# Patient Record
Sex: FEMALE | Race: BLACK OR AFRICAN AMERICAN | Employment: OTHER | URBAN - METROPOLITAN AREA
[De-identification: names, ages, dates, MRNs, and addresses within clinical notes are randomized per-mention and may not be internally consistent; named-entity substitution may affect disease eponyms.]

---

## 2017-04-06 ENCOUNTER — OP HISTORICAL/CONVERTED ENCOUNTER (OUTPATIENT)
Dept: OTHER | Age: 67
End: 2017-04-06

## 2019-10-03 LAB — PAP SMEAR, EXTERNAL: NORMAL

## 2019-10-10 LAB — MAMMOGRAPHY, EXTERNAL: NEGATIVE

## 2020-05-13 LAB
CREATININE, EXTERNAL: 0.8
HBA1C MFR BLD HPLC: 5.9 %
LDL-C, EXTERNAL: 102

## 2020-06-29 ENCOUNTER — OFFICE VISIT (OUTPATIENT)
Dept: ENDOCRINOLOGY | Age: 70
End: 2020-06-29

## 2020-06-29 VITALS
HEIGHT: 64 IN | HEART RATE: 66 BPM | DIASTOLIC BLOOD PRESSURE: 87 MMHG | OXYGEN SATURATION: 95 % | SYSTOLIC BLOOD PRESSURE: 178 MMHG | TEMPERATURE: 98 F | RESPIRATION RATE: 20 BRPM | BODY MASS INDEX: 28.48 KG/M2 | WEIGHT: 166.8 LBS

## 2020-06-29 DIAGNOSIS — R79.89 LOW TSH LEVEL: Primary | ICD-10-CM

## 2020-06-29 RX ORDER — POLYETHYLENE GLYCOL 3350 17 G/17G
17 POWDER, FOR SOLUTION ORAL
COMMUNITY

## 2020-06-29 RX ORDER — CHOLECALCIFEROL (VITAMIN D3) 125 MCG
CAPSULE ORAL
COMMUNITY

## 2020-06-29 RX ORDER — ASCORBIC ACID 500 MG
1000 TABLET ORAL DAILY
COMMUNITY

## 2020-06-29 RX ORDER — BUDESONIDE AND FORMOTEROL FUMARATE DIHYDRATE 160; 4.5 UG/1; UG/1
2 AEROSOL RESPIRATORY (INHALATION) 2 TIMES DAILY
COMMUNITY

## 2020-06-29 RX ORDER — CHOLECALCIFEROL TAB 125 MCG (5000 UNIT) 125 MCG
TAB ORAL DAILY
COMMUNITY

## 2020-06-29 RX ORDER — LANOLIN ALCOHOL/MO/W.PET/CERES
400 CREAM (GRAM) TOPICAL DAILY
COMMUNITY

## 2020-06-29 NOTE — LETTER
6/29/20 Patient: Arcenio Fischer YOB: 1950 Date of Visit: 6/29/2020 Anneliese Mckeon MD 
[address] [place] [address] VIA Facsimile: [phone] Dear Anneliese Mckeon MD, Thank you for referring Ms. Rosario Lopez to [address] [place] [address] for evaluation. My notes for this consultation are attached. If you have questions, please do not hesitate to call me. I look forward to following your patient along with you. Sincerely, Juliane Dudley MD

## 2020-06-29 NOTE — PROGRESS NOTES
1. Have you been to the ER, urgent care clinic since your last visit? No  Hospitalized since your last visit? No    2. Have you seen or consulted any other health care providers outside of the [address] [place] [address] since your last visit? Include any pap smears or colon screening.  No    Wt Readings from Last 3 Encounters:   06/29/20 166 lb 12.8 oz (75.7 kg)   08/10/16 158 lb (71.7 kg)   02/15/16 161 lb (73 kg)     Temp Readings from Last 3 Encounters:   06/29/20 98 °F (36.7 °C) (Oral)   08/10/16 97.3 °F (36.3 °C) (Oral)   02/15/16 98.4 °F (36.9 °C)     BP Readings from Last 3 Encounters:   06/29/20 178/87   08/10/16 150/88   02/15/16 124/67     Pulse Readings from Last 3 Encounters:   06/29/20 66   08/10/16 62   02/15/16 78

## 2020-06-29 NOTE — PROGRESS NOTES
HISTORY OF PRESENT ILLNESS  Heidy Choe is a 69 y.o. female. HPI  Initial visit for low TSH  0.3     Patient had seen me for the same problem/early Graves' disease more than 5 years ago  Patient recalls being treated with methimazole and later she had developed skin rash we had discontinued methimazole    Patient has not developed any other new medical problems.   She had seen dermatologist and she felt the rash is fading off    Denies any hyper thyrodi symptoms like palpitations, weight loss, irritability   No family h/o cancer, radiation     No family h/o thyrodi diseases in family       Past Medical History:   Diagnosis Date    Asthma     Diverticulosis     Hypercholesterolemia     Hyperlipidaemia     Hypertension     Menopause     Thyroid disease        Social History     Socioeconomic History    Marital status:      Spouse name: Not on file    Number of children: Not on file    Years of education: Not on file    Highest education level: Not on file   Occupational History    Not on file   Social Needs    Financial resource strain: Not on file    Food insecurity     Worry: Not on file     Inability: Not on file    Transportation needs     Medical: Not on file     Non-medical: Not on file   Tobacco Use    Smoking status: Never Smoker    Smokeless tobacco: Never Used   Substance and Sexual Activity    Alcohol use: No    Drug use: No    Sexual activity: Not on file   Lifestyle    Physical activity     Days per week: Not on file     Minutes per session: Not on file    Stress: Not on file   Relationships    Social connections     Talks on phone: Not on file     Gets together: Not on file     Attends [employer] service: Not on file     Active member of club or organization: Not on file     Attends meetings of clubs or organizations: Not on file     Relationship status: Not on file    Intimate partner violence     Fear of current or ex partner: Not on file     Emotionally abused: Not on file     Physically abused: Not on file     Forced sexual activity: Not on file   Other Topics Concern    Not on file   Social History Narrative    Not on file       Family History   Problem Relation Age of Onset    Cancer Mother          Review of Systems   Constitutional: Negative. HENT: Negative. Eyes: Negative for pain and redness. Respiratory: Negative. Cardiovascular: Negative for chest pain, palpitations and leg swelling. Gastrointestinal: Negative. Negative for constipation. Genitourinary: Negative. Musculoskeletal: Negative for myalgias. Skin: Negative. Neurological: Negative. Endo/Heme/Allergies: Negative. Psychiatric/Behavioral: Negative for depression and memory loss. The patient does not have insomnia. Physical Exam  Constitutional:       Appearance: She is well-developed. HENT:      Head: Normocephalic. Eyes:      Conjunctiva/sclera: Conjunctivae normal.      Pupils: Pupils are equal, round, and reactive to light. Neck:      Musculoskeletal: Normal range of motion and neck supple. Thyroid: No thyromegaly. Vascular: No JVD. Trachea: No tracheal deviation. Cardiovascular:      Rate and Rhythm: Normal rate and regular rhythm. Heart sounds: Normal heart sounds. No murmur. Pulmonary:      Breath sounds: Normal breath sounds. Abdominal:      General: Bowel sounds are normal.      Palpations: Abdomen is soft. Musculoskeletal: Normal range of motion. Lymphadenopathy:      Cervical: No cervical adenopathy. Skin:     General: Skin is warm. Neurological:      Mental Status: She is alert and oriented to person, place, and time. Deep Tendon Reflexes: Reflexes are normal and symmetric. ASSESSMENT and PLAN    Low TSH  :   TSH is  0.332  From may 2020  , compared to  0.336   From  August 11 2014 March 2012 - usg normal   She was given  methimazole at 2.5 mg a day.  stayed Euthyroid   She has been off since march 2013 from tapazole       On careful review of my old notes and the current information, I did not change the TSH values have changed much. Patient is asymptomatic and appears clinically euthyroid  The TSH  is normal for her and she is an outlier for lab range   I do not recommend any treatment or intervention at this time        2. Skin black macules  - fading          3.  . Pre-diabetes : A1c is 5.9 %   From  may 2020       F/u PRN    > 50 % of time is spent on counseling   Patient voiced understanding her plan of care

## 2020-11-19 PROBLEM — M85.80 OSTEOPENIA: Status: ACTIVE | Noted: 2020-11-19

## 2020-11-19 PROBLEM — E78.00 HYPERCHOLESTEROLEMIA: Status: ACTIVE | Noted: 2020-11-19

## 2020-11-19 PROBLEM — E66.9 OBESITY: Status: ACTIVE | Noted: 2020-11-19

## 2020-11-19 PROBLEM — K57.32 DIVERTICULITIS OF COLON: Status: ACTIVE | Noted: 2020-11-19

## 2020-11-19 PROBLEM — I10 HYPERTENSIVE DISORDER: Status: ACTIVE | Noted: 2020-11-19

## 2020-11-19 PROBLEM — Z78.0 MENOPAUSE: Status: ACTIVE | Noted: 2020-11-19

## 2020-11-25 ENCOUNTER — OFFICE VISIT (OUTPATIENT)
Dept: OBGYN CLINIC | Age: 70
End: 2020-11-25
Payer: MEDICARE

## 2020-11-25 VITALS
DIASTOLIC BLOOD PRESSURE: 84 MMHG | HEIGHT: 65 IN | WEIGHT: 166.13 LBS | SYSTOLIC BLOOD PRESSURE: 144 MMHG | BODY MASS INDEX: 27.68 KG/M2

## 2020-11-25 DIAGNOSIS — R10.32 LLQ PAIN: ICD-10-CM

## 2020-11-25 DIAGNOSIS — Z80.41 FAMILY HISTORY OF OVARIAN CANCER: ICD-10-CM

## 2020-11-25 DIAGNOSIS — N95.1 MENOPAUSAL SYNDROME: ICD-10-CM

## 2020-11-25 DIAGNOSIS — Z12.4 SCREENING FOR MALIGNANT NEOPLASM OF THE CERVIX: Primary | ICD-10-CM

## 2020-11-25 PROCEDURE — G8536 NO DOC ELDER MAL SCRN: HCPCS | Performed by: OBSTETRICS & GYNECOLOGY

## 2020-11-25 PROCEDURE — G8419 CALC BMI OUT NRM PARAM NOF/U: HCPCS | Performed by: OBSTETRICS & GYNECOLOGY

## 2020-11-25 PROCEDURE — G8427 DOCREV CUR MEDS BY ELIG CLIN: HCPCS | Performed by: OBSTETRICS & GYNECOLOGY

## 2020-11-25 PROCEDURE — G8510 SCR DEP NEG, NO PLAN REQD: HCPCS | Performed by: OBSTETRICS & GYNECOLOGY

## 2020-11-25 PROCEDURE — G0101 CA SCREEN;PELVIC/BREAST EXAM: HCPCS | Performed by: OBSTETRICS & GYNECOLOGY

## 2020-11-25 NOTE — PROGRESS NOTES
Laura Srivastava is a , 69 y.o. female   No LMP recorded. Patient is postmenopausal.    She presents for her annual    She is having no significant problems. , does not at times a LLQ pain- has been seen by GI and Ortho. nagging twisting type pain at times, no GI sxs      Menstrual status:  Her periods are: menopause. Cycles are: menopause. She does not have dysmenorrhea. Medical conditions:  Since her last annual GYN exam about one year ago, she has not the following changes in her health history: none. Past Medical History:   Diagnosis Date    Asthma     Diverticulosis     Hypercholesterolemia     Hyperlipidaemia     Hypertension     Menopause     Osteopenia     Thyroid disease      Past Surgical History:   Procedure Laterality Date    CYSTOSCOPY      HX COLONOSCOPY      HX HEART CATHETERIZATION      HX SKIN BIOPSY  2012    HX TUBAL LIGATION      HX TUBAL LIGATION Bilateral     ME WRIST ARTHROSCOP,RELEASE Walter Sue LIG         Prior to Admission medications    Medication Sig Start Date End Date Taking? Authorizing Provider   budesonide-formoteroL (Symbicort) 160-4.5 mcg/actuation HFAA Take 2 Puffs by inhalation two (2) times a day. Yes Provider, Historical   biotin 5,000 mcg TbDi Take  by mouth. Yes Provider, Historical   ascorbic acid, vitamin C, (Vitamin C) 500 mg tablet Take 1,000 mg by mouth daily. Yes Provider, Historical   cholecalciferol (VITAMIN D3) (5000 Units/125 mcg) tab tablet Take  by mouth daily. Yes Provider, Historical   magnesium oxide (MAG-OX) 400 mg tablet Take 400 mg by mouth daily. Yes Provider, Historical   polyethylene glycol (Miralax) 17 gram/dose powder Take 17 g by mouth nightly. Yes Provider, Historical   mometasone (ELOCON) 0.1 % topical cream Apply  to affected area daily. Yes Provider, Historical   nebivolol (BYSTOLIC) 5 mg tablet Take 5 mg by mouth two (2) times a day.    Yes Provider, Historical   rosuvastatin (CRESTOR) 5 mg tablet Take  by mouth nightly. Yes Provider, Historical   fluticasone (FLONASE) 50 mcg/Actuation nasal spray by Nasal route as needed. 8/12/10  Yes Provider, Historical       Allergies   Allergen Reactions    Bactrim [Sulfamethoxazole-Trimethoprim] Rash    Biaxin [Clarithromycin] Rash    Codeine Unknown (comments)     Vomiting    Hydrochlorothiazide Unknown (comments)    Iodine Rash    Oxycodone Nausea Only    Pcn [Penicillins] Rash    Sulfa (Sulfonamide Antibiotics) Nausea Only    Cephalexin Unknown (comments)    Flonase [Fluticasone] Unknown (comments)    Other Medication Unknown (comments)     \"mycins\"          Tobacco History:  reports that she has never smoked. She has never used smokeless tobacco.  Alcohol Abuse:  reports no history of alcohol use. Drug Abuse:  reports no history of drug use. Family Medical/Cancer History:   Family History   Problem Relation Age of Onset    Cancer Mother           Review of Systems   Constitutional: Negative for chills, fever, malaise/fatigue and weight loss. HENT: Negative for congestion, ear pain, sinus pain and tinnitus. Eyes: Negative for blurred vision and double vision. Respiratory: Negative for cough, shortness of breath and wheezing. Cardiovascular: Negative for chest pain and palpitations. Gastrointestinal: Negative for abdominal pain, blood in stool, constipation, diarrhea, heartburn, nausea and vomiting. Genitourinary: Negative for dysuria, flank pain, frequency, hematuria and urgency. Musculoskeletal: Negative for joint pain and myalgias. Skin: Negative for itching and rash. Neurological: Negative for dizziness, weakness and headaches. Psychiatric/Behavioral: Negative for depression, memory loss and suicidal ideas. The patient is not nervous/anxious and does not have insomnia. Physical Exam  Constitutional:       Appearance: Normal appearance. HENT:      Head: Normocephalic and atraumatic.    Cardiovascular: Rate and Rhythm: Normal rate. Heart sounds: Normal heart sounds. Pulmonary:      Effort: Pulmonary effort is normal.      Breath sounds: Normal breath sounds. Chest:      Breasts:         Right: Normal.         Left: Normal.   Abdominal:      General: Abdomen is flat. Palpations: Abdomen is soft. Genitourinary:     General: Normal vulva. Vagina: Normal.      Cervix: Normal.      Uterus: Normal.       Adnexa: Right adnexa normal and left adnexa normal.      Rectum: Normal.      Comments: PAP Obtained  Neurological:      Mental Status: She is alert. Psychiatric:         Mood and Affect: Mood normal.         Behavior: Behavior normal.         Thought Content: Thought content normal.          Visit Vitals  BP (!) 144/84 (BP 1 Location: Left arm, BP Patient Position: Sitting)   Ht 5' 5\" (1.651 m)   Wt 166 lb 2 oz (75.4 kg)   BMI 27.64 kg/m²         Assessment:  Diagnoses and all orders for this visit:    1. Screening for malignant neoplasm of the cervix  -     PAP, LB, RFX HPV EIHPI(710909)    2. Menopausal syndrome    3. Family history of ovarian cancer  -     US PELV NON OBS; Future    4. LLQ pain  -     US PELV NON OBS; Future    Had a  a year or so ago- normal per pt, last US over 1 year ago as well. Plan:Questions addressed  Counseled re: diet, exercise, healthy lifestyle  Return for Annual  Rec annual mammogram  Pelvic US        Follow-up and Dispositions    · Return for 1 yr annual, 1 yr mammo.

## 2020-12-01 LAB
CYTOLOGIST CVX/VAG CYTO: NORMAL
CYTOLOGY CVX/VAG DOC CYTO: NORMAL
DX ICD CODE: NORMAL
LABCORP, 190119: NORMAL
Lab: NORMAL
OTHER STN SPEC: NORMAL
STAT OF ADQ CVX/VAG CYTO-IMP: NORMAL

## 2020-12-04 ENCOUNTER — HOSPITAL ENCOUNTER (OUTPATIENT)
Dept: ULTRASOUND IMAGING | Age: 70
Discharge: HOME | End: 2020-12-04
Attending: OBSTETRICS & GYNECOLOGY
Payer: MEDICARE

## 2020-12-04 DIAGNOSIS — R10.32 LLQ PAIN: ICD-10-CM

## 2020-12-04 DIAGNOSIS — Z80.41 FAMILY HISTORY OF OVARIAN CANCER: ICD-10-CM

## 2020-12-04 PROCEDURE — 76856 US EXAM PELVIC COMPLETE: CPT

## 2020-12-26 ENCOUNTER — TRANSCRIBE ORDER (OUTPATIENT)
Dept: REGISTRATION | Age: 70
End: 2020-12-26

## 2020-12-26 ENCOUNTER — HOSPITAL ENCOUNTER (OUTPATIENT)
Dept: LAB | Age: 70
Discharge: HOME | End: 2020-12-26
Payer: MEDICARE

## 2020-12-26 DIAGNOSIS — Z20.822 ENCOUNTER FOR PREPROCEDURE SCREENING LABORATORY TESTING FOR COVID-19: Primary | ICD-10-CM

## 2020-12-26 DIAGNOSIS — Z20.822 ENCOUNTER FOR PREPROCEDURE SCREENING LABORATORY TESTING FOR COVID-19: ICD-10-CM

## 2020-12-26 DIAGNOSIS — Z01.812 ENCOUNTER FOR PREPROCEDURE SCREENING LABORATORY TESTING FOR COVID-19: Primary | ICD-10-CM

## 2020-12-26 DIAGNOSIS — Z01.812 ENCOUNTER FOR PREPROCEDURE SCREENING LABORATORY TESTING FOR COVID-19: ICD-10-CM

## 2020-12-26 PROCEDURE — 87635 SARS-COV-2 COVID-19 AMP PRB: CPT

## 2020-12-27 LAB — SARS-COV-2, COV2NT: NOT DETECTED

## 2020-12-29 NOTE — PERIOP NOTES
[address]  Endoscopy Preprocedure Instructions      1. On the day of your surgery, please report to registration located on the 2nd floor of the  [facility]. yes    2. You must have a responsible adult to drive you to the hospital, stay at the hospital during your procedure and drive you home. If they leave your procedure will not be started (no exceptions). yes    3. Do not have anything to eat or drink (including water, gum, mints, coffee, and juice) after midnight. This does not apply to the medications you were instructed to take by your physician. yesIf you are currently taking Plavix, Coumadin, Aspirin, or other blood-thinning agents, contact your physician for special instructions. not applicable. 4. If you are having a procedure that requires bowel prep: We recommend that you have only clear liquids the day before your procedure and begin your bowel prep by 5:00 pm.  You may continue to drink clear liquids until midnight. If for any reason you are not able to complete your prep please notify your physician immediately. yes    5. Have a list of all current medications, including vitamins, herbal supplements and any other over the counter medications. yes    6. If you wear glasses, contacts, dentures and/or hearing aids, they may be removed prior to procedure, please bring a case to store them in. yes    7. You should understand that if you do not follow these instructions your procedure may be cancelled. If your physical condition changes (I.e. fever, cold or flu) please contact your doctor as soon as possible. 8. It is important that you be on time.   If for any reason you are unable to keep your appointment please call [phone] the day of or your physicians office prior to your scheduled procedure

## 2020-12-30 ENCOUNTER — ANESTHESIA EVENT (OUTPATIENT)
Dept: ENDOSCOPY | Age: 70
End: 2020-12-30
Payer: MEDICARE

## 2020-12-30 ENCOUNTER — ANESTHESIA (OUTPATIENT)
Dept: ENDOSCOPY | Age: 70
End: 2020-12-30
Payer: MEDICARE

## 2020-12-30 ENCOUNTER — HOSPITAL ENCOUNTER (OUTPATIENT)
Age: 70
Setting detail: OUTPATIENT SURGERY
Discharge: HOME | End: 2020-12-30
Attending: INTERNAL MEDICINE | Admitting: INTERNAL MEDICINE
Payer: MEDICARE

## 2020-12-30 VITALS
WEIGHT: 163.14 LBS | DIASTOLIC BLOOD PRESSURE: 73 MMHG | HEIGHT: 63 IN | TEMPERATURE: 97.6 F | BODY MASS INDEX: 28.91 KG/M2 | OXYGEN SATURATION: 100 % | SYSTOLIC BLOOD PRESSURE: 161 MMHG | HEART RATE: 58 BPM | RESPIRATION RATE: 22 BRPM

## 2020-12-30 PROCEDURE — 76040000019: Performed by: INTERNAL MEDICINE

## 2020-12-30 PROCEDURE — 2709999900 HC NON-CHARGEABLE SUPPLY: Performed by: INTERNAL MEDICINE

## 2020-12-30 PROCEDURE — 76060000031 HC ANESTHESIA FIRST 0.5 HR: Performed by: INTERNAL MEDICINE

## 2020-12-30 PROCEDURE — 74011250636 HC RX REV CODE- 250/636: Performed by: NURSE ANESTHETIST, CERTIFIED REGISTERED

## 2020-12-30 PROCEDURE — 74011000258 HC RX REV CODE- 258: Performed by: NURSE ANESTHETIST, CERTIFIED REGISTERED

## 2020-12-30 RX ORDER — ATROPINE SULFATE 0.1 MG/ML
0.5 INJECTION INTRAVENOUS
Status: DISCONTINUED | OUTPATIENT
Start: 2020-12-30 | End: 2020-12-30 | Stop reason: HOSPADM

## 2020-12-30 RX ORDER — ALBUTEROL SULFATE 90 UG/1
AEROSOL, METERED RESPIRATORY (INHALATION)
COMMUNITY

## 2020-12-30 RX ORDER — FENTANYL CITRATE 50 UG/ML
100 INJECTION, SOLUTION INTRAMUSCULAR; INTRAVENOUS
Status: DISCONTINUED | OUTPATIENT
Start: 2020-12-30 | End: 2020-12-30 | Stop reason: HOSPADM

## 2020-12-30 RX ORDER — MIDAZOLAM HYDROCHLORIDE 1 MG/ML
.25-5 INJECTION, SOLUTION INTRAMUSCULAR; INTRAVENOUS
Status: DISCONTINUED | OUTPATIENT
Start: 2020-12-30 | End: 2020-12-30 | Stop reason: HOSPADM

## 2020-12-30 RX ORDER — PROPOFOL 10 MG/ML
INJECTION, EMULSION INTRAVENOUS
Status: DISCONTINUED | OUTPATIENT
Start: 2020-12-30 | End: 2020-12-30 | Stop reason: HOSPADM

## 2020-12-30 RX ORDER — SODIUM CHLORIDE 9 MG/ML
50 INJECTION, SOLUTION INTRAVENOUS CONTINUOUS
Status: DISCONTINUED | OUTPATIENT
Start: 2020-12-30 | End: 2020-12-30 | Stop reason: HOSPADM

## 2020-12-30 RX ORDER — DEXTROMETHORPHAN/PSEUDOEPHED 2.5-7.5/.8
1.2 DROPS ORAL
Status: DISCONTINUED | OUTPATIENT
Start: 2020-12-30 | End: 2020-12-30 | Stop reason: HOSPADM

## 2020-12-30 RX ORDER — NALOXONE HYDROCHLORIDE 0.4 MG/ML
0.4 INJECTION, SOLUTION INTRAMUSCULAR; INTRAVENOUS; SUBCUTANEOUS
Status: DISCONTINUED | OUTPATIENT
Start: 2020-12-30 | End: 2020-12-30 | Stop reason: HOSPADM

## 2020-12-30 RX ORDER — EPINEPHRINE 0.1 MG/ML
1 INJECTION INTRACARDIAC; INTRAVENOUS
Status: DISCONTINUED | OUTPATIENT
Start: 2020-12-30 | End: 2020-12-30 | Stop reason: HOSPADM

## 2020-12-30 RX ORDER — SODIUM CHLORIDE 9 MG/ML
INJECTION, SOLUTION INTRAVENOUS
Status: DISCONTINUED | OUTPATIENT
Start: 2020-12-30 | End: 2020-12-30 | Stop reason: HOSPADM

## 2020-12-30 RX ORDER — FLUMAZENIL 0.1 MG/ML
0.2 INJECTION INTRAVENOUS
Status: DISCONTINUED | OUTPATIENT
Start: 2020-12-30 | End: 2020-12-30 | Stop reason: HOSPADM

## 2020-12-30 RX ADMIN — PROPOFOL 200 MCG/KG/MIN: 10 INJECTION, EMULSION INTRAVENOUS at 13:03

## 2020-12-30 RX ADMIN — SODIUM CHLORIDE: 9 INJECTION, SOLUTION INTRAVENOUS at 12:57

## 2020-12-30 NOTE — PROGRESS NOTES
Bedside and Verbal shift change report given to Gaytan,RN (oncoming nurse) by Virginia Urias (offgoing nurse). Report included the following information SBAR.

## 2020-12-30 NOTE — PROGRESS NOTES
Initial RN admission and assessment performed and documented in Endoscopy navigator. Patient evaluated by anesthesia in pre-procedure holding. All procedural vital signs, airway assessment, and level of consciousness information monitored and recorded by anesthesia staff on the anesthesia record. Report received from CRNA post procedure. Patient transported to recovery area by RN. Endoscope was pre-cleaned at bedside immediately following procedure by Ayo Herring. Onset: 2-3 days.     Location / description: Patient reports having urinary symptoms. She has no fever, she does have back pain, on the left side which kept her from sleeping. She is feeling nauseated from the pain. She does have urinary urgency, cloudy urine.     Precipitating Factors: none    Pain Scale (1-10), 10 highest: 4/10    Associated Symptoms: none    What improves/worsens symptoms: heating pad helps    Symptom specific medications: ibuprofen    LMP : Patient's last menstrual period was 12/16/2019 (approximate).     Are you pregnant or breast feeding: not assessed    Recent Care: none in the last month    Did the patient have a positive coronavirus screening?: No    PLAN:  Directed to Urgent Care    Patient/Caller agrees to follow recommendations.      Reason for Disposition  • Side (flank) or lower back pain present    Protocols used: URINARY SYMPTOMS-A-AH

## 2020-12-30 NOTE — ROUTINE PROCESS
Megan Tena 1950 
[id number] Situation: 
Verbal report received from: E Owino-Gaytan 
Procedure: Procedure(s): 
COLONOSCOPY Background: 
 
Preoperative diagnosis: RLOWER ABDOMINAL PAIN 
IRRITABLE BOWEL SYNDROME Postoperative diagnosis: 1.- Diverticulosis :  Dr. Shadi Neri Assistant(s): Endoscopy Technician-1: Gabe Barry Endoscopy RN-1: Deyanira Silva RN Specimens: * No specimens in log * H. Pylori  no Assessment: 
Intra-procedure medications Anesthesia gave intra-procedure sedation and medications, see anesthesia flow sheet yes [facility]venous [facility] Vital signs stable Abdominal assessment: round and soft Recommendation: 
Discharge patient per MD order. Family- Permission to share finding with family or friend yes Endoscopy discharge instructions have been reviewed and given to patient. The patient verbalized understanding and acceptance of instructions. Dr. Shadi Neri discussed with patient procedure findings and next steps.

## 2020-12-30 NOTE — DISCHARGE INSTRUCTIONS
Je Maddox  [id number]  1950    DISCHARGE INSTRUCTIONS  Discomfort:  Redness at IV site- apply warm compress to area; if redness or soreness persist- contact your physician. There may be a slight amount of blood passed from the rectum. Gaseous discomfort - walking, belching will help relieve any discomfort. You may not operate a vehicle for 12 hours. You may not engage in an occupation involving machinery or appliances for rest of today. You may not drink alcoholic beverages for at least 12 hours. Avoid making any critical decisions for at least 24 hours. DIET:   High fiber diet. - however -  remember your colon is empty and a heavy meal will produce gas. Avoid these foods:  vegetables, fried / greasy foods, carbonated drinks for today. Medications:                Resume usual medications today   ACTIVITY:  You may resume your normal daily activities it is recommended that you spend the remainder of the day resting -  avoid any strenuous activity. CALL M.D. ANY SIGN OF:   Increasing pain, nausea, vomiting  Abdominal distension (swelling)  New increased bleeding (oral or rectal)  Fever (chills)  Pain in chest area  Bloody discharge from nose or mouth  Shortness of breath     Follow-up Instructions:  Call Dr. Judith Batista if you have any questions or problems.   Can use over the counter either ibuprofen or naproxen as needed for pains        DISCHARGE SUMMARY from Nurse    The following personal items collected during your admission are returned to you:   Dental Appliance: Dental Appliances: None  Vision: Visual Aid: Glasses  Hearing Aid:    Jewelry:    Clothing:    Other Valuables:    Valuables sent to safe:

## 2020-12-30 NOTE — H&P
Patient Name: Efrain Trent   2020   Gender: Female    (age): 1950 (70)       Referring Physician:     King Octavio Monroy [facility] [address] [facility]  [phone] (phone)  [phone] (fax)        Chief Complaint: abdominal pain           History of Present Illness:             Pains discussed  have worsened and are now present daily for multiple episodes daily. Pains can awaken from sleep. No diarrhea, constipation, bleeding. In 2017 when in hospital for pancreatitis CT showed severe spine changes L4-L5 with additional changes L1-L4? ? Past Medical History      Medical Conditions: Asthma  Diverticulitis  High blood pressure  High cholesterol  Thyroid problems   Surgical Procedures: Cystoscopy  CATH - Cardiac  Foot Surgery  Tubal Ligation   Dx Studies: Abdominal U/S  CAT Scan  Colonoscopy,   Hemoccult cards, 2020 negative for blood    Medications: Bystolic 10 mg Take 1 tablet by mouth twice a day as directed  Crestor 5 mg Take 1 tablet by mouth once a day as directed  fluticasone propionate 50 mcg/actuation 1 puff into mouth once a day as directed  Symbicort 160-4.5 mcg/actuation Take 2 inhalations twice per day. Gargle mouth with water each use. Allergies: amlodipine, benicar, biaxin, hydrochlorothiazide  cephalexin/oxycodone  Codeine Sulfate  Iodine-Iodine Containing  micyn  Penicillins  Sulfa (Sulfonamide Antibiotics)   Immunizations: Flu vaccine, 10/1/2019  Pneumococcal conjugate PCV 13,   Hep B, 2008  TB Test      Social History      Alcohol: None   Tobacco: Never smoker   Drugs: None   Exercise: Exercise less than 3 times a week. Caffeine: Daily. Family History No history of Colon Cancer, Colon Polyps, Esophogeal Cancer, GI Cancers, IBD (Crohn's or UC), Liver disease        Review of Systems:   Allergic/Immunologic: Denies persistent infections.    Cardiovascular: Denies chest pain, dyspnea with exercise, irregular heart beat, orthopnea, palpitations, peripheral edema, syncope. Constitutional: Denies fatigue, fever, weight loss. ENMT: Denies difficulty swallowing, dizziness, ear pain. Gastrointestinal: Denies abdominal swelling, change in bowel habits, constipation, diarrhea, Bloating/gas, heartburn, jaundice, rectal bleeding. Genitourinary: Denies dark urine, decrease in urine flow, dysuria, frequent urinary infections, frequent urination, hematuria. Psychiatric: Denies anxiety, depression, difficulty sleeping, hallucinations. Respiratory: Denies asthma, cough, dyspnea. Vital Signs: See nursing notes     Physical Exam:   Constitutional:      Appearance: No distress, appears comfortable. Skin:      Inspection: No rash, no jaundice. Eyes:      Conjunctivae/lids: Normal.   ENMT:      External: Normal.   Neck:      Neck: Normal appearance, trachea midline. Respiratory: Lungs clear to percussion and auscultation    Effort: Normal respiratory effort, comfortable, speaks in complete sentences. Cardiac regular rate and rhythm    Abdomen: non-distended, nontender   Liver/Spleen: normal, normal size, Liver size and consistency normal, spleen is non-palpable. Musculoskeletal:      Gait/station: normal.   Extremities:      RLE: Normal.   LLE: Normal.   Psychiatric:      Judgment/insight: Normal, normal judgement, normal insight. Impressions: Low back pain? ?  Lower abdominal pain    Plan:  I've discussed colonoscopy possible biopsy, polypectomy, cautery, injection, alternatives, complications including but not limited to pain, cardiopulmonary event, bleeding, perforation requiring additional blood transfusion or operative repair; all questions answered.   Should intestinal pathology ne seen,  I have advised that she should be seen with a spine surgeon

## 2021-01-01 NOTE — ANESTHESIA POSTPROCEDURE EVALUATION
Procedure(s):  COLONOSCOPY. MAC    Anesthesia Post Evaluation      Multimodal analgesia: multimodal analgesia used between 6 hours prior to anesthesia start to PACU discharge  Patient location during evaluation: PACU  Patient participation: complete - patient participated  Level of consciousness: awake and alert  Pain management: adequate  Airway patency: patent  Anesthetic complications: no  Cardiovascular status: acceptable  Respiratory status: acceptable  Hydration status: acceptable  Post anesthesia nausea and vomiting:  none      INITIAL Post-op Vital signs:   Vitals Value Taken Time   /78 12/30/20 1346   Temp 36.4 °C (97.6 °F) 12/30/20 1326   Pulse 58 12/30/20 1348   Resp 18 12/30/20 1348   SpO2 100 % 12/30/20 1348   Vitals shown include unvalidated device data.

## 2021-11-01 ENCOUNTER — OFFICE VISIT (OUTPATIENT)
Dept: ENDOCRINOLOGY | Age: 71
End: 2021-11-01
Payer: COMMERCIAL

## 2021-11-01 VITALS
BODY MASS INDEX: 28 KG/M2 | HEART RATE: 66 BPM | SYSTOLIC BLOOD PRESSURE: 162 MMHG | WEIGHT: 158 LBS | RESPIRATION RATE: 18 BRPM | DIASTOLIC BLOOD PRESSURE: 76 MMHG | HEIGHT: 63 IN | TEMPERATURE: 97.3 F | OXYGEN SATURATION: 97 %

## 2021-11-01 DIAGNOSIS — E55.9 VITAMIN D DEFICIENCY: ICD-10-CM

## 2021-11-01 DIAGNOSIS — R79.89 LOW TSH LEVEL: Primary | ICD-10-CM

## 2021-11-01 DIAGNOSIS — L65.9 HAIR LOSS: ICD-10-CM

## 2021-11-01 LAB
25(OH)D3 SERPL-MCNC: 49 NG/ML (ref 30–100)
FERRITIN SERPL-MCNC: 125 NG/ML (ref 8–252)
TSH SERPL DL<=0.05 MIU/L-ACNC: 0.32 UIU/ML (ref 0.36–3.74)

## 2021-11-01 PROCEDURE — 99214 OFFICE O/P EST MOD 30 MIN: CPT | Performed by: INTERNAL MEDICINE

## 2021-11-01 RX ORDER — GABAPENTIN 300 MG/1
300 CAPSULE ORAL DAILY
COMMUNITY

## 2021-11-01 NOTE — PROGRESS NOTES
HISTORY OF PRESENT ILLNESS  Griselda Santos is a 66 y.o. female. First follow up visit after  Initial visit for low TSH  0.3   From June 2020    She is c/o  Hair fall which  Increased since August 2021     She is started on gabapentin for back ache by Dr. Afua Shay  In July 2021  She takes combo of vitamins,  Zinc- vit D - vit C  - vit E     She felt like she must have get checked TSH        June 2020     Patient had seen me for the same problem/early Graves' disease more than 5 years ago  Patient recalls being treated with methimazole and later she had developed skin rash we had discontinued methimazole  Patient has not developed any other new medical problems. She had seen dermatologist and she felt the rash is fading off      ASSESSMENT and PLAN    1. Hair loss  : ? Medication   Check level - TSH and Vit D  Level       2  Low TSH  :   TSH is  0.332  From may 2020  , compared to  0.336   From  August 11 2014 March 2012 - usg normal   She was given  methimazole at 2.5 mg a day. stayed Euthyroid   She has been off since march 2013 from tapazole   On careful review of my old notes and the current information, I did not see  TSH values have changed much. Patient is asymptomatic and appears clinically euthyroid  The TSH  is normal for her and she is an outlier for lab range   I did  not recommend any treatment or intervention since 2013         2. Skin black macules  - fading       3.  She f/u with Dr. Jaun Kanner for abnormal CBC - has no diagnosis   She  Had  8.5 mm found on  upper lobe of lung - f/u with Dr. Waleska Bear       F/u PRN    > 50 % of time is spent on counseling   Patient voiced understanding her plan of care

## 2021-11-01 NOTE — LETTER
11/1/2021    Patient: Yasmin Thompson   YOB: 1950   Date of Visit: 11/1/2021     [address] [id number]  Via Fax: [phone]    Dear Abby Paul MD,      Thank you for referring Ms. Bishop Recinos to [id number] [place] [address] for evaluation. My notes for this consultation are attached. If you have questions, please do not hesitate to call me. I look forward to following your patient along with you.       Sincerely,    Gonzalez Brannon MD

## 2021-11-01 NOTE — PATIENT INSTRUCTIONS
SPECIFIC INSTRUCTIONS BELOW     No meds     -------------PAY ATTENTION TO THESE GENERAL INSTRUCTIONS -----------------      - The medications prescribed at this visit will not be available at pharmacy until 6 pm       - YOUR MED LIST IS NOT UP TO DATE AS SOME CHANGES ARE BEING MADE AFTER THE VISIT - FOLLOW SPECIFIC INSTRUCTIONS  ABOVE     -ANY tests other than blood work, which you opt to do  outside the  [facility] facilities, you are responsible for prior authorizations if  required    - [address] [phone]- PLEASE IGNORE     Results     *Normal results will not be notified by a phone call starting January 1 2021   *If you have an upcoming visit, the results will be discussed at the visit   *Please sign up for MY CHART if you want access to your lab and test results  *Abnormal results which require immediate attention will be notified by phone call   *Abnormal results which do not require immediate assistance will be notified in 1-2 weeks       Refills    -    have your pharmacy send us a refill request . Refills are done max for one year and a visit is a must before refills are extended    Follow up appointments -  highly encourage you to make it when you are checking out. We can accommodate you into the schedule based on your clinical situation, but not for extending refills beyond a year. Labs are important to give refills and is important to get labs before the visit     Phone calls  -  Allow  24 hrs.  for non-urgent calls to be returned  Prior authorization - It may take 2-4 weeks to process  Forms  -  FMLA, DMV etc., will take up to 2 weeks to process  Cancellations - please notify the office 2 days in advance   Samples  - will only be dispensed at visits       If not showing for the appointments and cancelling appointments within 24 hours are kept track of and three  of such situations in  two consecutive years will likely be considered for termination from the practice    -------------------------------------------------------------------------------------------------------------------

## 2021-12-02 ENCOUNTER — OFFICE VISIT (OUTPATIENT)
Dept: OBGYN CLINIC | Age: 71
End: 2021-12-02
Payer: COMMERCIAL

## 2021-12-02 VITALS — HEIGHT: 63 IN | WEIGHT: 153.5 LBS | BODY MASS INDEX: 27.2 KG/M2

## 2021-12-02 DIAGNOSIS — Z12.4 SCREENING FOR MALIGNANT NEOPLASM OF THE CERVIX: Primary | ICD-10-CM

## 2021-12-02 DIAGNOSIS — N95.1 MENOPAUSAL SYNDROME: ICD-10-CM

## 2021-12-02 DIAGNOSIS — Z01.419 GYNECOLOGIC EXAM NORMAL: ICD-10-CM

## 2021-12-02 PROCEDURE — 99397 PER PM REEVAL EST PAT 65+ YR: CPT | Performed by: OBSTETRICS & GYNECOLOGY

## 2021-12-02 NOTE — PROGRESS NOTES
Jaguar Code is a , 66 y.o. female   No LMP recorded. Patient is postmenopausal.    She presents for her annual    She is having no significant problems. Menstrual status:  Cycles are menopausal.    Flow: absent. She does not have dysmenorrhea. Medical conditions:  Since her last annual GYN exam about one year ago, she has not the following changes in her health history: none. Mammogram History:    FANI Results (most recent):  No results found for this or any previous visit. DEXA Results (most recent):  No results found for this or any previous visit. Past Medical History:   Diagnosis Date    Asthma     Diverticulosis     Hypercholesterolemia     Hyperlipidaemia     Hypertension     Menopause     Nodule of left lung     8.5 mm upper left lobe    Osteopenia     Thyroid disease      Past Surgical History:   Procedure Laterality Date    COLONOSCOPY N/A 2020    COLONOSCOPY performed by Katty Lawrence MD at [address] HX COLONOSCOPY      HX GI  2020    colonoscopy    HX HEART CATHETERIZATION          HX ORTHOPAEDIC      hammer toe and bunion surgery on both feet.  HX SKIN BIOPSY  2012    HX TUBAL LIGATION      HX TUBAL LIGATION Bilateral     ME WRIST ARTHROSCOP,RELEASE Orpah Thurston LIG         Prior to Admission medications    Medication Sig Start Date End Date Taking? Authorizing Provider   gabapentin (NEURONTIN) 300 mg capsule Take 300 mg by mouth daily. Yes Provider, Historical   albuterol (PROVENTIL HFA, VENTOLIN HFA, PROAIR HFA) 90 mcg/actuation inhaler Take  by inhalation. Yes Provider, Historical   budesonide-formoteroL (Symbicort) 160-4.5 mcg/actuation HFAA Take 2 Puffs by inhalation two (2) times a day. Yes Provider, Historical   biotin 5,000 mcg TbDi Take  by mouth. Yes Provider, Historical   ascorbic acid, vitamin C, (Vitamin C) 500 mg tablet Take 1,000 mg by mouth daily.    Yes Provider, Historical cholecalciferol (VITAMIN D3) (5000 Units/125 mcg) tab tablet Take  by mouth daily. Yes Provider, Historical   magnesium oxide (MAG-OX) 400 mg tablet Take 400 mg by mouth daily. Yes Provider, Historical   polyethylene glycol (Miralax) 17 gram/dose powder Take 17 g by mouth nightly. Yes Provider, Historical   mometasone (ELOCON) 0.1 % topical cream Apply  to affected area daily. Yes Provider, Historical   nebivolol (BYSTOLIC) 5 mg tablet Take 5 mg by mouth two (2) times a day. Yes Provider, Historical   rosuvastatin (CRESTOR) 5 mg tablet Take  by mouth nightly. Yes Provider, Historical   fluticasone (FLONASE) 50 mcg/Actuation nasal spray by Nasal route as needed. 8/12/10  Yes Provider, Historical       Allergies   Allergen Reactions    Bactrim [Sulfamethoxazole-Trimethoprim] Rash    Biaxin [Clarithromycin] Rash    Codeine Unknown (comments)     Vomiting    Hydrochlorothiazide Unknown (comments)    Iodine Rash    Oxycodone Nausea Only    Pcn [Penicillins] Rash    Sulfa (Sulfonamide Antibiotics) Nausea Only    Cephalexin Unknown (comments)    Flonase [Fluticasone] Unknown (comments)     Pt states pt is not allergic to flonase.  Other Medication Unknown (comments)     \"mycins\"          Tobacco History:  reports that she has never smoked. She has never used smokeless tobacco.  Alcohol Abuse:  reports no history of alcohol use. Drug Abuse:  reports no history of drug use. Family Medical/Cancer History:   Family History   Problem Relation Age of Onset    Cancer Mother           Review of Systems   Constitutional: Negative for chills, fever, malaise/fatigue and weight loss. HENT: Negative for congestion, ear pain, sinus pain and tinnitus. Eyes: Negative for blurred vision and double vision. Respiratory: Negative for cough, shortness of breath and wheezing. Cardiovascular: Negative for chest pain and palpitations.    Gastrointestinal: Negative for abdominal pain, blood in stool, constipation, diarrhea, heartburn, nausea and vomiting. Genitourinary: Negative for dysuria, flank pain, frequency, hematuria and urgency. Musculoskeletal: Negative for joint pain and myalgias. Skin: Negative for itching and rash. Neurological: Negative for dizziness, weakness and headaches. Psychiatric/Behavioral: Negative for depression, memory loss and suicidal ideas. The patient is not nervous/anxious and does not have insomnia. Physical Exam  Constitutional:       Appearance: Normal appearance. HENT:      Head: Normocephalic and atraumatic. Cardiovascular:      Rate and Rhythm: Normal rate. Heart sounds: Normal heart sounds. Pulmonary:      Effort: Pulmonary effort is normal.      Breath sounds: Normal breath sounds. Chest:   Breasts:      Right: Normal.      Left: Normal.       Abdominal:      General: Abdomen is flat. Palpations: Abdomen is soft. Genitourinary:     General: Normal vulva. Vagina: Normal.      Cervix: Normal.      Uterus: Normal.       Adnexa: Right adnexa normal and left adnexa normal.      Rectum: Normal.      Comments: PAP Obtained  Neurological:      Mental Status: She is alert. Psychiatric:         Mood and Affect: Mood normal.         Behavior: Behavior normal.         Thought Content: Thought content normal.          Visit Vitals  Ht 5' 3\" (1.6 m)   Wt 153 lb 8 oz (69.6 kg)   BMI 27.19 kg/m²         Assessment:   Diagnoses and all orders for this visit:    1. Screening for malignant neoplasm of the cervix  -     PAP IG, RFX APTIMA HPV ASCUS ([id number])    2. Menopausal syndrome    3. Gynecologic exam normal    Recently had a w/u( CT scan, PET scan) for a lung nodule- all studies suggest benign    Plan: Questions addressed  Counseled re: diet, exercise, healthy lifestyle  Return for Annual  Rec annual mammogram        Follow-up and Dispositions    · Return for Mammogram, 1 yr annual, 1 yr mammo.

## 2021-12-07 LAB
CYTOLOGIST CVX/VAG CYTO: NORMAL
CYTOLOGY CVX/VAG DOC CYTO: NORMAL
CYTOLOGY CVX/VAG DOC THIN PREP: NORMAL
DX ICD CODE: NORMAL
LABCORP, 190119: NORMAL
Lab: NORMAL
OTHER STN SPEC: NORMAL
PATHOLOGIST CVX/VAG CYTO: NORMAL
STAT OF ADQ CVX/VAG CYTO-IMP: NORMAL

## 2022-03-18 PROBLEM — I10 HYPERTENSIVE DISORDER: Status: ACTIVE | Noted: 2020-11-19

## 2022-03-19 PROBLEM — Z78.0 MENOPAUSE: Status: ACTIVE | Noted: 2020-11-19

## 2022-03-19 PROBLEM — K57.32 DIVERTICULITIS OF COLON: Status: ACTIVE | Noted: 2020-11-19

## 2022-03-19 PROBLEM — E66.9 OBESITY: Status: ACTIVE | Noted: 2020-11-19

## 2022-03-19 PROBLEM — E78.00 HYPERCHOLESTEROLEMIA: Status: ACTIVE | Noted: 2020-11-19

## 2022-03-20 PROBLEM — Z80.41 FAMILY HISTORY OF OVARIAN CANCER: Status: ACTIVE | Noted: 2020-11-25

## 2022-03-20 PROBLEM — M85.80 OSTEOPENIA: Status: ACTIVE | Noted: 2020-11-19

## 2022-12-06 ENCOUNTER — OFFICE VISIT (OUTPATIENT)
Dept: OBGYN CLINIC | Age: 72
End: 2022-12-06
Payer: MEDICARE

## 2022-12-06 VITALS
DIASTOLIC BLOOD PRESSURE: 84 MMHG | BODY MASS INDEX: 27.53 KG/M2 | WEIGHT: 155.38 LBS | SYSTOLIC BLOOD PRESSURE: 144 MMHG | HEIGHT: 63 IN

## 2022-12-06 DIAGNOSIS — N95.1 MENOPAUSAL SYNDROME: ICD-10-CM

## 2022-12-06 DIAGNOSIS — Z12.4 SCREENING FOR MALIGNANT NEOPLASM OF THE CERVIX: Primary | ICD-10-CM

## 2022-12-06 RX ORDER — CEPHALEXIN 500 MG/1
500 CAPSULE ORAL 2 TIMES DAILY
Qty: 20 CAPSULE | Refills: 0 | Status: SHIPPED | OUTPATIENT
Start: 2022-12-06 | End: 2022-12-16

## 2022-12-06 NOTE — PROGRESS NOTES
Holly Whelan is a , 67 y.o. female   No LMP recorded. Patient is postmenopausal.    She presents for her annual    She is having no significant problems. Menstrual status:  Cycles are menopausal.    Flow: absent. She does not have dysmenorrhea. Medical conditions:  Since her last annual GYN exam about one year ago, she has not the following changes in her health history: none. Mammogram History:    FANI Results (most recent):  No results found for this or any previous visit. DEXA Results (most recent):  No results found for this or any previous visit. Past Medical History:   Diagnosis Date    Asthma     Diverticulosis     Hypercholesterolemia     Hyperlipidaemia     Hypertension     Menopause     Nodule of left lung     8.5 mm upper left lobe    Osteopenia     Thyroid disease      Past Surgical History:   Procedure Laterality Date    COLONOSCOPY N/A 2020    COLONOSCOPY performed by Lubna Baeza MD at [address] [place] [address]      HX COLONOSCOPY      HX GI  2020    colonoscopy    HX HEART CATHETERIZATION          HX ORTHOPAEDIC      hammer toe and bunion surgery on both feet. HX SKIN BIOPSY  2012    HX TUBAL LIGATION      HX TUBAL LIGATION Bilateral     DE WRIST Elenor Hurter LIG         Prior to Admission medications    Medication Sig Start Date End Date Taking? Authorizing Provider   cephALEXin (KEFLEX) 500 mg capsule Take 1 Capsule by mouth two (2) times a day for 10 days. 22 Yes Carmela Jaramillo MD   gabapentin (NEURONTIN) 300 mg capsule Take 300 mg by mouth daily. Yes Provider, Historical   albuterol (PROVENTIL HFA, VENTOLIN HFA, PROAIR HFA) 90 mcg/actuation inhaler Take  by inhalation. Yes Provider, Historical   budesonide-formoteroL (SYMBICORT) 160-4.5 mcg/actuation HFAA Take 2 Puffs by inhalation two (2) times a day. Yes Provider, Historical   biotin 5,000 mcg TbDi Take  by mouth.    Yes Provider, Historical   ascorbic acid, vitamin C, (VITAMIN C) 500 mg tablet Take 1,000 mg by mouth daily. Yes Provider, Historical   cholecalciferol (VITAMIN D3) (5000 Units/125 mcg) tab tablet Take  by mouth daily. Yes Provider, Historical   magnesium oxide (MAG-OX) 400 mg tablet Take 400 mg by mouth daily. Yes Provider, Historical   polyethylene glycol (MIRALAX) 17 gram/dose powder Take 17 g by mouth nightly. Yes Provider, Historical   nebivoloL (BYSTOLIC) 5 mg tablet Take 5 mg by mouth two (2) times a day. Yes Provider, Historical   rosuvastatin (CRESTOR) 5 mg tablet Take  by mouth nightly. Yes Provider, Historical   fluticasone propionate (FLONASE) 50 mcg/actuation nasal spray by Nasal route as needed. 8/12/10  Yes Provider, Historical       Allergies   Allergen Reactions    Bactrim [Sulfamethoxazole-Trimethoprim] Rash    Biaxin [Clarithromycin] Rash    Codeine Unknown (comments)     Vomiting    Hydrochlorothiazide Unknown (comments)    Iodine Rash    Oxycodone Nausea Only    Pcn [Penicillins] Rash    Sulfa (Sulfonamide Antibiotics) Nausea Only    Cephalexin Unknown (comments)    Flonase [Fluticasone] Unknown (comments)     Pt states pt is not allergic to flonase. Other Medication Unknown (comments)     \"mycins\"          Tobacco History:  reports that she has never smoked. She has never used smokeless tobacco.  Alcohol use:  reports no history of alcohol use. Drug use:  reports no history of drug use. Family Medical/Cancer History:   Family History   Problem Relation Age of Onset    Cancer Mother           Review of Systems   Constitutional:  Negative for chills, fever, malaise/fatigue and weight loss. HENT:  Negative for congestion, ear pain, sinus pain and tinnitus. Eyes:  Negative for blurred vision and double vision. Respiratory:  Negative for cough, shortness of breath and wheezing. Cardiovascular:  Negative for chest pain and palpitations.    Gastrointestinal:  Negative for abdominal pain, blood in stool, constipation, diarrhea, heartburn, nausea and vomiting. Genitourinary:  Negative for dysuria, flank pain, frequency, hematuria and urgency. Musculoskeletal:  Negative for joint pain and myalgias. Skin:  Negative for itching and rash. Neurological:  Negative for dizziness, weakness and headaches. Psychiatric/Behavioral:  Negative for depression, memory loss and suicidal ideas. The patient is not nervous/anxious and does not have insomnia. Physical Exam  Constitutional:       Appearance: Normal appearance. HENT:      Head: Normocephalic and atraumatic. Cardiovascular:      Rate and Rhythm: Normal rate. Heart sounds: Normal heart sounds. Pulmonary:      Effort: Pulmonary effort is normal.      Breath sounds: Normal breath sounds. Chest:   Breasts:     Right: Normal.      Left: Normal.   Abdominal:      General: Abdomen is flat. Palpations: Abdomen is soft. Genitourinary:     General: Normal vulva. Vagina: Normal.      Cervix: Normal.      Uterus: Normal.       Adnexa: Right adnexa normal and left adnexa normal.      Rectum: Normal.      Comments: PAP Obtained  Urethra normal  Atrophy present  Neurological:      Mental Status: She is alert. Psychiatric:         Mood and Affect: Mood normal.         Behavior: Behavior normal.         Thought Content: Thought content normal.        Visit Vitals  BP (!) 144/84 (BP 1 Location: Right arm, BP Patient Position: Sitting, BP Cuff Size: Small adult)   Ht 5' 3\" (1.6 m)   Wt 155 lb 6 oz (70.5 kg)   BMI 27.52 kg/m²         Assessment:Diagnoses and all orders for this visit:    1. Screening for malignant neoplasm of the cervix  -     PAP IG, RFX APTIMA HPV ASCUS ([id number])    2. Menopausal syndrome    Other orders  -     cephALEXin (KEFLEX) 500 mg capsule; Take 1 Capsule by mouth two (2) times a day for 10 days.     Sore throat- erythema on exam, uncomfortable to swallow rachael on the right side    Plan: Questions addressed  Counseled re: diet, exercise, healthy lifestyle  Return for Annual  Rec annual mammogram      Follow-up and Dispositions    Return for 1 yr annual, 1 yr mammo.

## 2022-12-06 NOTE — PROGRESS NOTES
Chief Complaint   Patient presents with    Annual Exam     Mammo-, Dexa-2-2021     Visit Vitals  BP (!) 144/84 (BP 1 Location: Right arm, BP Patient Position: Sitting, BP Cuff Size: Small adult)   Ht 5' 3\" (1.6 m)   Wt 155 lb 6 oz (70.5 kg)   BMI 27.52 kg/m²

## 2022-12-09 LAB
CYTOLOGIST CVX/VAG CYTO: NORMAL
CYTOLOGY CVX/VAG DOC CYTO: NORMAL
CYTOLOGY CVX/VAG DOC THIN PREP: NORMAL
DX ICD CODE: NORMAL
LABCORP, 190119: NORMAL
Lab: NORMAL
Lab: NORMAL
OTHER STN SPEC: NORMAL
STAT OF ADQ CVX/VAG CYTO-IMP: NORMAL

## 2023-05-23 NOTE — PROCEDURES
[address]  [phone]      2020    Colonoscopy Procedure Note  Linda Running  :  1950  [facility] Medical Record Number: [id number]    Indications:     Abdominal pain, LLQ  PCP:  Felicity Francois MD  Anesthesia/Sedation: see nursing notes  Endoscopist:  Dr. Michelle Mccallum  Assistants: None  Complications:  None  Estimate Blood Loss:  None    Permit:  The indications, risks, benefits and alternatives were reviewed with the patient or their decision maker who was provided an opportunity to ask questions and all questions were answered. The specific risks of colonoscopy with conscious sedation were reviewed, including but not limited to anesthetic complication, bleeding, adverse drug reaction, missed lesion, infection, IV site reactions, and intestinal perforation which would lead to the need for surgical repair. Alternatives to colonoscopy including radiographic imaging, observation without testing, or laboratory testing were reviewed including the limitations of those alternatives. After considering the options and having all their questions answered, the patient or their decision maker provided both verbal and written consent to proceed. Procedure in Detail:  After obtaining informed consent, positioning of the patient in the left lateral decubitus position, and conduction of a pre-procedure pause or \"time out\" the endoscope was introduced into the anus and advanced to the terminal ileum. The quality of the colonic preparation was good. A careful inspection was made as the colonoscope was withdrawn, findings and interventions are described below.     Appendiceal orifice photographed    Findings:   no mucosal lesion appreciated  Single ascending diverticulum; not inflammed    Specimens:    none    Implants: none    Complications:   None; patient tolerated the procedure well.  Estimated blood loss: none    Impression:  No explanation for abdominal pains on today's exam.      Recommendations:      - CT scans in past had identified significant LSS degenerative disease;; believe this is most significant contribution recent complaints   PRN antiinflammatories  Because of age, the option of no follow up exam if offered    Thank you for entrusting me with this patient's care. Please do not hesitate to contact me with any questions or if I can be of assistance with any of your other patients' GI needs.     Signed By: Manolo Jose MD                        December 30, 2020 Musculoskeletal pain

## 2023-05-25 RX ORDER — ROSUVASTATIN CALCIUM 5 MG/1
TABLET, COATED ORAL
COMMUNITY

## 2023-05-25 RX ORDER — MAGNESIUM OXIDE 400 MG/1
400 TABLET ORAL DAILY
COMMUNITY

## 2023-05-25 RX ORDER — ASCORBIC ACID 500 MG
1000 TABLET ORAL DAILY
COMMUNITY

## 2023-05-25 RX ORDER — FLUTICASONE PROPIONATE 50 MCG
SPRAY, SUSPENSION (ML) NASAL PRN
COMMUNITY
Start: 2010-08-12

## 2023-05-25 RX ORDER — GABAPENTIN 300 MG/1
300 CAPSULE ORAL DAILY
COMMUNITY

## 2023-05-25 RX ORDER — POLYETHYLENE GLYCOL 3350 17 G/17G
17 POWDER, FOR SOLUTION ORAL
COMMUNITY

## 2023-05-25 RX ORDER — ALBUTEROL SULFATE 90 UG/1
AEROSOL, METERED RESPIRATORY (INHALATION)
COMMUNITY

## 2023-05-25 RX ORDER — NEBIVOLOL 5 MG/1
5 TABLET ORAL 2 TIMES DAILY
COMMUNITY

## 2023-12-08 ENCOUNTER — OFFICE VISIT (OUTPATIENT)
Age: 73
End: 2023-12-08
Payer: MEDICARE

## 2023-12-08 VITALS
BODY MASS INDEX: 27.84 KG/M2 | WEIGHT: 157.13 LBS | SYSTOLIC BLOOD PRESSURE: 156 MMHG | HEIGHT: 63 IN | DIASTOLIC BLOOD PRESSURE: 86 MMHG

## 2023-12-08 DIAGNOSIS — Z12.4 PAP SMEAR FOR CERVICAL CANCER SCREENING: Primary | ICD-10-CM

## 2023-12-08 DIAGNOSIS — N95.1 MENOPAUSAL SYNDROME: ICD-10-CM

## 2023-12-08 DIAGNOSIS — Z01.419 GYNECOLOGIC EXAM NORMAL: ICD-10-CM

## 2023-12-08 PROCEDURE — G0101 CA SCREEN;PELVIC/BREAST EXAM: HCPCS | Performed by: OBSTETRICS & GYNECOLOGY

## 2023-12-08 PROCEDURE — G8419 CALC BMI OUT NRM PARAM NOF/U: HCPCS | Performed by: OBSTETRICS & GYNECOLOGY

## 2023-12-08 PROCEDURE — G8427 DOCREV CUR MEDS BY ELIG CLIN: HCPCS | Performed by: OBSTETRICS & GYNECOLOGY

## 2023-12-08 RX ORDER — CHLORTHALIDONE 25 MG/1
TABLET ORAL
COMMUNITY
Start: 2023-09-25

## 2023-12-08 RX ORDER — BUDESONIDE AND FORMOTEROL FUMARATE DIHYDRATE 160; 4.5 UG/1; UG/1
AEROSOL RESPIRATORY (INHALATION)
COMMUNITY
Start: 2020-05-26

## 2023-12-08 ASSESSMENT — ENCOUNTER SYMPTOMS
RESPIRATORY NEGATIVE: 1
GASTROINTESTINAL NEGATIVE: 1

## 2023-12-13 LAB
., LABCORP: NORMAL
CYTOLOGIST CVX/VAG CYTO: NORMAL
CYTOLOGY CVX/VAG DOC CYTO: NORMAL
CYTOLOGY CVX/VAG DOC THIN PREP: NORMAL
DX ICD CODE: NORMAL
Lab: NORMAL
Lab: NORMAL
OTHER STN SPEC: NORMAL
STAT OF ADQ CVX/VAG CYTO-IMP: NORMAL

## 2024-04-15 ENCOUNTER — HOSPITAL ENCOUNTER (OUTPATIENT)
Facility: HOSPITAL | Age: 74
Discharge: HOME | End: 2024-04-18
Payer: MEDICARE

## 2024-04-15 VITALS — BODY MASS INDEX: 27.82 KG/M2 | HEIGHT: 63 IN | WEIGHT: 157 LBS

## 2024-04-15 DIAGNOSIS — N64.4 MASTODYNIA: ICD-10-CM

## 2024-04-15 DIAGNOSIS — N64.4 BREAST PAIN, LEFT: ICD-10-CM

## 2024-04-15 PROCEDURE — 76642 ULTRASOUND BREAST LIMITED: CPT

## 2024-04-15 PROCEDURE — G0279 TOMOSYNTHESIS, MAMMO: HCPCS

## 2024-05-09 ENCOUNTER — OFFICE VISIT (OUTPATIENT)
Age: 74
End: 2024-05-09
Payer: MEDICARE

## 2024-05-09 VITALS
HEART RATE: 61 BPM | TEMPERATURE: 97.7 F | DIASTOLIC BLOOD PRESSURE: 84 MMHG | WEIGHT: 161.6 LBS | OXYGEN SATURATION: 95 % | HEIGHT: 63 IN | BODY MASS INDEX: 28.63 KG/M2 | SYSTOLIC BLOOD PRESSURE: 167 MMHG

## 2024-05-09 DIAGNOSIS — R79.89 LOW TSH LEVEL: Primary | ICD-10-CM

## 2024-05-09 PROCEDURE — 99214 OFFICE O/P EST MOD 30 MIN: CPT | Performed by: INTERNAL MEDICINE

## 2024-05-09 PROCEDURE — 1123F ACP DISCUSS/DSCN MKR DOCD: CPT | Performed by: INTERNAL MEDICINE

## 2024-05-09 PROCEDURE — 3079F DIAST BP 80-89 MM HG: CPT | Performed by: INTERNAL MEDICINE

## 2024-05-09 PROCEDURE — 3077F SYST BP >= 140 MM HG: CPT | Performed by: INTERNAL MEDICINE

## 2024-05-09 NOTE — PROGRESS NOTES
activity change.      Blood pressure (!) 167/84, pulse 61, temperature 97.7 °F (36.5 °C), temperature source Temporal, height 1.6 m (5' 3\"), weight 73.3 kg (161 lb 9.6 oz), SpO2 95 %.  Physical Exam  Constitutional:       Appearance: Normal appearance.   Cardiovascular:      Rate and Rhythm: Normal rate.   Pulmonary:      Effort: Pulmonary effort is normal.   Musculoskeletal:      Cervical back: Normal range of motion.   Neurological:      General: No focal deficit present.         Labs      Lab Results   Component Value Date    TSH 0.32 (L) 11/01/2021               Assessment and Plan     Low TSH  :   TSH is  0.332  From may 2020  , compared to  0.336   From  August 11 2014 March 2012 - usg normal   She was given  methimazole at 2.5 mg a day. stayed Euthyroid   She has been off since march 2013 from tapazole     On careful review of my old notes and the current information, I did not see  TSH values have changed much.  Patient is asymptomatic and appears clinically euthyroid  The TSH  is normal for her and she is an outlier for lab range   I did  not recommend any treatment or intervention since 2013  and so will be now         Alis Soares MD  5/9/2024

## 2024-05-14 ENCOUNTER — TELEPHONE (OUTPATIENT)
Age: 74
End: 2024-05-14

## 2024-05-16 ENCOUNTER — TELEPHONE (OUTPATIENT)
Age: 74
End: 2024-05-16

## 2024-05-16 DIAGNOSIS — Z78.0 MENOPAUSE: ICD-10-CM

## 2024-05-16 DIAGNOSIS — R79.89 LOW TSH LEVEL: Primary | ICD-10-CM

## 2024-05-16 NOTE — TELEPHONE ENCOUNTER
The message to be conveyed to her is this  --     I have seen her multiple times for this low TSH   0.3 and I have assured her that this is normal     I would like her to do this one lab test - just in case  which helps me to understand if there is any other subtle issue      I will order the labs if she lets me know where she prefers to get them     Alis Soares MD

## 2024-05-16 NOTE — TELEPHONE ENCOUNTER
Returned patient call, documented on another encounter. Dr. Soares requesting additional lab. To be drawn at [facility]. Dr. Soares to put in order

## 2024-05-18 ENCOUNTER — TRANSCRIBE ORDERS (OUTPATIENT)
Facility: HOSPITAL | Age: 74
End: 2024-05-18

## 2024-05-18 DIAGNOSIS — N64.4 PAIN OF BOTH BREASTS: ICD-10-CM

## 2024-05-18 DIAGNOSIS — D64.9 ANEMIA, UNSPECIFIED TYPE: Primary | ICD-10-CM

## 2024-05-21 LAB
FSH SERPL-ACNC: 96.1 MIU/ML (ref 25.8–134.8)
LH SERPL-ACNC: 43.5 MIU/ML (ref 7.7–58.5)
PROLACTIN SERPL-MCNC: 7.5 NG/ML (ref 3.6–25.2)

## 2024-06-06 ENCOUNTER — HOSPITAL ENCOUNTER (OUTPATIENT)
Age: 74
Discharge: HOME | End: 2024-06-06
Payer: MEDICARE

## 2024-06-06 DIAGNOSIS — N64.4 PAIN OF BOTH BREASTS: ICD-10-CM

## 2024-06-06 DIAGNOSIS — D64.9 ANEMIA, UNSPECIFIED TYPE: ICD-10-CM

## 2024-06-06 PROCEDURE — 6360000004 HC RX CONTRAST MEDICATION: Performed by: RADIOLOGY

## 2024-06-06 PROCEDURE — C8908 MRI W/O FOL W/CONT, BREAST,: HCPCS

## 2024-06-06 PROCEDURE — A9585 GADOBUTROL INJECTION: HCPCS | Performed by: RADIOLOGY

## 2024-06-06 RX ORDER — GADOBUTROL 604.72 MG/ML
7 INJECTION INTRAVENOUS
Status: COMPLETED | OUTPATIENT
Start: 2024-06-06 | End: 2024-06-06

## 2024-06-06 RX ADMIN — GADOBUTROL 7 ML: 604.72 INJECTION INTRAVENOUS at 11:31

## 2024-10-29 ENCOUNTER — TRANSCRIBE ORDERS (OUTPATIENT)
Facility: HOSPITAL | Age: 74
End: 2024-10-29

## 2024-10-29 DIAGNOSIS — Z12.31 OTHER SCREENING MAMMOGRAM: Primary | ICD-10-CM

## 2024-11-04 ENCOUNTER — OFFICE VISIT (OUTPATIENT)
Age: 74
End: 2024-11-04
Payer: MEDICARE

## 2024-11-04 VITALS
HEIGHT: 63 IN | HEART RATE: 64 BPM | TEMPERATURE: 98 F | OXYGEN SATURATION: 98 % | DIASTOLIC BLOOD PRESSURE: 88 MMHG | BODY MASS INDEX: 28.28 KG/M2 | SYSTOLIC BLOOD PRESSURE: 173 MMHG | WEIGHT: 159.6 LBS

## 2024-11-04 DIAGNOSIS — E01.0 THYROMEGALY: ICD-10-CM

## 2024-11-04 DIAGNOSIS — R79.89 LOW TSH LEVEL: Primary | ICD-10-CM

## 2024-11-04 PROCEDURE — 99214 OFFICE O/P EST MOD 30 MIN: CPT | Performed by: INTERNAL MEDICINE

## 2024-11-04 PROCEDURE — 3079F DIAST BP 80-89 MM HG: CPT | Performed by: INTERNAL MEDICINE

## 2024-11-04 PROCEDURE — 1036F TOBACCO NON-USER: CPT | Performed by: INTERNAL MEDICINE

## 2024-11-04 PROCEDURE — G8419 CALC BMI OUT NRM PARAM NOF/U: HCPCS | Performed by: INTERNAL MEDICINE

## 2024-11-04 PROCEDURE — 1123F ACP DISCUSS/DSCN MKR DOCD: CPT | Performed by: INTERNAL MEDICINE

## 2024-11-04 PROCEDURE — 1160F RVW MEDS BY RX/DR IN RCRD: CPT | Performed by: INTERNAL MEDICINE

## 2024-11-04 PROCEDURE — 1126F AMNT PAIN NOTED NONE PRSNT: CPT | Performed by: INTERNAL MEDICINE

## 2024-11-04 PROCEDURE — G8427 DOCREV CUR MEDS BY ELIG CLIN: HCPCS | Performed by: INTERNAL MEDICINE

## 2024-11-04 PROCEDURE — 3017F COLORECTAL CA SCREEN DOC REV: CPT | Performed by: INTERNAL MEDICINE

## 2024-11-04 PROCEDURE — 1090F PRES/ABSN URINE INCON ASSESS: CPT | Performed by: INTERNAL MEDICINE

## 2024-11-04 PROCEDURE — G8400 PT W/DXA NO RESULTS DOC: HCPCS | Performed by: INTERNAL MEDICINE

## 2024-11-04 PROCEDURE — 1159F MED LIST DOCD IN RCRD: CPT | Performed by: INTERNAL MEDICINE

## 2024-11-04 PROCEDURE — 3077F SYST BP >= 140 MM HG: CPT | Performed by: INTERNAL MEDICINE

## 2024-11-04 PROCEDURE — G8484 FLU IMMUNIZE NO ADMIN: HCPCS | Performed by: INTERNAL MEDICINE

## 2024-11-04 NOTE — PROGRESS NOTES
Terra Diaz is a 74 y.o. female here for   Chief Complaint   Patient presents with    Thyroid Problem       1. Have you been to the ER, urgent care clinic since your last visit?  Hospitalized since your last visit? -  no  2. Have you seen or consulted any other health care providers outside of the [facility] since your last visit?  Include any pap smears or colon screening.-no    
Sulfamethoxazole-Trimethoprim Rash    Cephalexin      Other reaction(s): Unknown (comments)    Fluticasone      Other reaction(s): Unknown (comments)  Pt states pt is not allergic to flonase.        Review of Systems   Constitutional:  Positive for activity change.        Blood pressure (!) 173/88, pulse 64, temperature 98 °F (36.7 °C), temperature source Temporal, height 1.6 m (5' 3\"), weight 72.4 kg (159 lb 9.6 oz), SpO2 98%.  Physical Exam  Constitutional:       Appearance: Normal appearance.   Cardiovascular:      Rate and Rhythm: Normal rate.   Pulmonary:      Effort: Pulmonary effort is normal.   Musculoskeletal:      Cervical back: Normal range of motion.   Neurological:      General: No focal deficit present.           Labs   Reviewed  from   October 2024  : TSH  -   0.294       MARCH 29 2024  :    TSH  0.35         Assessment and Plan     Low TSH  : TSH is  0.332  From may 2020  , March 2012 - usg normal   She was given  methimazole at 2.5 mg a day. stayed Euthyroid   and   she stopped it  in  march 2013      On careful review of my old notes and the current information, I did not see  TSH values have changed much.  Patient is asymptomatic and appears clinically euthyroid  The TSH  is normal for her and she is an outlier for lab range   I did  not recommend any treatment or intervention since 2013  and so will be now        Nov 2024  :   low TSH is expected with pituirary issues which means central hypothyroid state   I did her pit function checks in May 2024  and noted to be normal , this rules out central hypothyroidism  She is asymptomatic and should nto be clinically significant   She has no CAD      Thyromegaly  -  will do usg  and  uptake and scan .  Will call her with results   There is  a  slight chance that [facility] can give low TSH  As long as her TSH is not  below 0.1  , pt is asymptomatic of hyperthyroid symptomatology       Follow up  KAYLEEN Soares MD  11/4/2024

## 2024-11-04 NOTE — PATIENT INSTRUCTIONS
SPECIFIC INSTRUCTIONS BELOW       None       -------------PAY ATTENTION TO THESE GENERAL INSTRUCTIONS -----------------      - The medications prescribed at this visit will not be available at pharmacy until 6 pm today        - your med list is not updated until the visit encounter is closed, so FOLLOW THE TYPED SPECIFIC INSTRUCTIONS  ABOVE     -ANY tests other than blood work, which you opt to do  outside the  [facility] imaging facilities, you are responsible for prior authorizations if  required    - health maintenance will not be updated  on AVS, so please ignore it       Results     *Normal results will not be notified by a phone call starting January 1 2024   *If you have an upcoming visit, the results will be discussed at the visit   *Please sign up for MY CHART if you want access to your lab and test results  *Abnormal results which require immediate attention will be notified by phone call   *Abnormal results which do not require immediate assistance will be notified in 1-2 weeks       Refills    -    have your pharmacy send us a refill request . Refills are done max for one year and a visit is a must before refills are extended    Follow up appointments -  highly encourage you to make it when you are checking out. We can accommodate you into the schedule based on your clinical situation, but not for extending refills beyond a year. Labs are important to give refills and it is important to get labs before the visit     Phone calls  -  Allow  24 hrs. for non-urgent calls to be returned  Prior authorization - It may take 2 to 4 weeks to process  Forms  -  FMLA, DMV etc., will take up to 2 weeks to process  Cancellations - please notify the office 2 days in advance   Samples  - will only be dispensed at visits       If not showing up for the appointment and/ or  cancelling appointment within 24 hours are kept track of and three such situations in  two consecutive years will likely be considered for

## 2024-11-08 ENCOUNTER — HOSPITAL ENCOUNTER (OUTPATIENT)
Facility: HOSPITAL | Age: 74
Discharge: HOME | End: 2024-11-11
Attending: SURGERY
Payer: MEDICARE

## 2024-11-08 DIAGNOSIS — Z12.31 OTHER SCREENING MAMMOGRAM: ICD-10-CM

## 2024-11-08 PROCEDURE — 77063 BREAST TOMOSYNTHESIS BI: CPT

## 2024-11-11 ENCOUNTER — TELEPHONE (OUTPATIENT)
Age: 74
End: 2024-11-11

## 2024-11-11 NOTE — TELEPHONE ENCOUNTER
Patient needs to hear back from nursing or doctor because she is allergic to iodine. Would like to schedule asap. Thank you

## 2024-11-11 NOTE — TELEPHONE ENCOUNTER
Pt states that when central scheduling called to schedule her procedure they were not sure if it contains iodine or not. Pt advised to call and check with you before she schedules the procedure. Pt allergic to iodine

## 2024-11-11 NOTE — TELEPHONE ENCOUNTER
She still can go thru these tests  despite  iodine allergy     If she is tolerating iodinated salt     If not , we have to pre treat her  like we do with  Ct contrast dyes           Alis Soares MD

## 2024-12-03 ENCOUNTER — HOSPITAL ENCOUNTER (OUTPATIENT)
Facility: HOSPITAL | Age: 74
Discharge: HOME | End: 2024-12-06
Attending: INTERNAL MEDICINE
Payer: MEDICARE

## 2024-12-03 DIAGNOSIS — R79.89 LOW TSH LEVEL: ICD-10-CM

## 2024-12-03 DIAGNOSIS — E01.0 THYROMEGALY: ICD-10-CM

## 2024-12-03 PROCEDURE — 78014 THYROID IMAGING W/BLOOD FLOW: CPT

## 2024-12-03 PROCEDURE — A9516 IODINE I-123 SOD IODIDE MIC: HCPCS | Performed by: INTERNAL MEDICINE

## 2024-12-03 PROCEDURE — 3430000000 HC RX DIAGNOSTIC RADIOPHARMACEUTICAL: Performed by: INTERNAL MEDICINE

## 2024-12-03 RX ORDER — SODIUM IODIDE I 123 200 UCI/1
230 CAPSULE, GELATIN COATED ORAL ONCE
Status: COMPLETED | OUTPATIENT
Start: 2024-12-03 | End: 2024-12-03

## 2024-12-03 RX ADMIN — SODIUM IODIDE I 123 230 MICRO CURIE: 200 CAPSULE, GELATIN COATED ORAL at 10:38

## 2024-12-04 ENCOUNTER — HOSPITAL ENCOUNTER (OUTPATIENT)
Facility: HOSPITAL | Age: 74
Discharge: HOME | End: 2024-12-07
Attending: INTERNAL MEDICINE
Payer: MEDICARE

## 2024-12-04 DIAGNOSIS — R79.89 LOW TSH LEVEL: ICD-10-CM

## 2024-12-04 DIAGNOSIS — E01.0 THYROMEGALY: ICD-10-CM

## 2024-12-04 PROCEDURE — 76536 US EXAM OF HEAD AND NECK: CPT

## 2024-12-06 ENCOUNTER — TELEPHONE (OUTPATIENT)
Age: 74
End: 2024-12-06

## 2024-12-06 NOTE — TELEPHONE ENCOUNTER
----- Message from Dr. Alis Soares MD sent at 12/6/2024  2:39 AM EST -----  Pt to be informed that the scan has come back as low and  that should be taken as normal     Alis Soares MD

## 2024-12-06 NOTE — TELEPHONE ENCOUNTER
Informed pt of Dr. Soares's note. Pt verbalized understanding with no further questions or concerns at this time.

## 2024-12-11 ENCOUNTER — TELEPHONE (OUTPATIENT)
Age: 74
End: 2024-12-11

## 2024-12-11 NOTE — TELEPHONE ENCOUNTER
----- Message from Dr. Alis Soares MD sent at 12/11/2024  5:29 AM EST -----  Inform pt that she has very small thyroid nodules  and do not require any follow up or  biopsies   These small nodules can explain for  low TSH     Alis Soares MD

## 2025-07-01 NOTE — ANESTHESIA PREPROCEDURE EVALUATION
Relevant Problems   No relevant active problems       Anesthetic History   No history of anesthetic complications            Review of Systems / Medical History  Patient summary reviewed, nursing notes reviewed and pertinent labs reviewed    Pulmonary            Asthma : well controlled  Pertinent negatives: No shortness of breath and recent URI     Neuro/Psych              Cardiovascular    Hypertension: well controlled                   GI/Hepatic/Renal                Endo/Other      Hypothyroidism: well controlled  Obesity     Other Findings              Physical Exam    Airway  Mallampati: II  TM Distance: 4 - 6 cm  Neck ROM: normal range of motion   Mouth opening: Normal     Cardiovascular  Regular rate and rhythm,  S1 and S2 normal,  no murmur, click, rub, or gallop             Dental    Dentition: Lower dentition intact and Upper dentition intact     Pulmonary  Breath sounds clear to auscultation               Abdominal         Other Findings            Anesthetic Plan    ASA: 2  Anesthesia type: MAC          Induction: Intravenous  Anesthetic plan and risks discussed with: Patient [FreeTextEntry1] : here for annual no complaints

## 2025-09-05 ENCOUNTER — TRANSCRIBE ORDERS (OUTPATIENT)
Facility: HOSPITAL | Age: 75
End: 2025-09-05

## 2025-09-05 DIAGNOSIS — N64.4 MASTODYNIA: Primary | ICD-10-CM

## 2025-09-05 DIAGNOSIS — D64.9 ANEMIA, UNSPECIFIED TYPE: ICD-10-CM

## (undated) DEVICE — SIMPLICITY FLUFF UNDERPAD 23X36, MODERATE: Brand: SIMPLICITY

## (undated) DEVICE — ELECTRODE,RADIOTRANSLUCENT,FOAM,3PK: Brand: MEDLINE

## (undated) DEVICE — BAG BELONG PT PERS CLEAR HANDL

## (undated) DEVICE — 1200 GUARD II KIT W/5MM TUBE W/O VAC TUBE: Brand: GUARDIAN

## (undated) DEVICE — KIT COLON W/ 1.1OZ LUB AND 2 END

## (undated) DEVICE — CATH IV AUTOGRD BC PNK 20GA 25 -- INSYTE

## (undated) DEVICE — ADULT SPO2 SENSOR,REMANUFACTURED,REPROCESSED DEVICE FOR SINGLE USE; REPROCESSED BY COVIDIEN LLC: Brand: NELLCOR

## (undated) DEVICE — Device

## (undated) DEVICE — SET ADMIN 16ML TBNG L100IN 2 Y INJ SITE IV PIGGY BK DISP

## (undated) DEVICE — SOLIDIFIER MEDC 1200ML -- CONVERT TO 356117